# Patient Record
Sex: MALE | Race: WHITE | NOT HISPANIC OR LATINO | Employment: UNEMPLOYED | URBAN - METROPOLITAN AREA
[De-identification: names, ages, dates, MRNs, and addresses within clinical notes are randomized per-mention and may not be internally consistent; named-entity substitution may affect disease eponyms.]

---

## 2018-03-07 PROBLEM — R05.9 COUGH: Status: ACTIVE | Noted: 2018-03-07

## 2018-03-07 PROBLEM — E78.2 MIXED HYPERLIPIDEMIA: Chronic | Status: ACTIVE | Noted: 2018-03-07

## 2018-03-07 PROBLEM — F33.41 RECURRENT MAJOR DEPRESSIVE DISORDER, IN PARTIAL REMISSION: Chronic | Status: ACTIVE | Noted: 2018-03-07

## 2018-03-07 PROBLEM — I10 ESSENTIAL HYPERTENSION: Chronic | Status: ACTIVE | Noted: 2018-03-07

## 2019-03-07 PROBLEM — E11.42 TYPE 2 DIABETES MELLITUS WITH DIABETIC POLYNEUROPATHY, WITHOUT LONG-TERM CURRENT USE OF INSULIN: Status: ACTIVE | Noted: 2018-03-07

## 2019-03-07 PROBLEM — I10 ESSENTIAL HYPERTENSION: Status: ACTIVE | Noted: 2018-03-07

## 2019-03-07 PROBLEM — L03.115 CELLULITIS OF BOTH LOWER EXTREMITIES: Status: ACTIVE | Noted: 2019-03-07

## 2019-03-07 PROBLEM — L03.116 CELLULITIS OF BOTH LOWER EXTREMITIES: Status: ACTIVE | Noted: 2019-03-07

## 2019-03-08 PROBLEM — J18.9 PNEUMONIA OF RIGHT LOWER LOBE DUE TO INFECTIOUS ORGANISM: Status: ACTIVE | Noted: 2019-03-08

## 2019-03-08 PROBLEM — I87.2 VENOUS STASIS DERMATITIS OF RIGHT LOWER EXTREMITY: Status: ACTIVE | Noted: 2019-03-08

## 2019-03-08 PROBLEM — R63.8 INADEQUATE ORAL INTAKE: Status: ACTIVE | Noted: 2019-03-08

## 2019-03-09 PROBLEM — I47.20 VENTRICULAR TACHYCARDIA: Status: ACTIVE | Noted: 2019-03-09

## 2019-03-10 PROBLEM — I95.9 HYPOTENSION: Status: ACTIVE | Noted: 2019-03-10

## 2019-03-11 PROBLEM — I25.10 CORONARY ARTERY DISEASE INVOLVING NATIVE CORONARY ARTERY OF NATIVE HEART: Status: ACTIVE | Noted: 2019-03-11

## 2019-03-11 PROBLEM — R63.8 ALTERATION IN NUTRITION: Status: ACTIVE | Noted: 2019-03-11

## 2019-03-11 PROBLEM — I50.41 ACUTE COMBINED SYSTOLIC AND DIASTOLIC HEART FAILURE: Status: ACTIVE | Noted: 2019-03-11

## 2019-04-29 PROBLEM — I50.42 CHRONIC COMBINED SYSTOLIC AND DIASTOLIC CHF (CONGESTIVE HEART FAILURE): Status: ACTIVE | Noted: 2019-03-11

## 2019-04-29 PROBLEM — L03.116 CELLULITIS OF BOTH LOWER EXTREMITIES: Status: RESOLVED | Noted: 2019-03-07 | Resolved: 2019-04-29

## 2019-04-29 PROBLEM — L03.115 CELLULITIS OF BOTH LOWER EXTREMITIES: Status: RESOLVED | Noted: 2019-03-07 | Resolved: 2019-04-29

## 2019-04-29 PROBLEM — R63.8 INADEQUATE ORAL INTAKE: Status: RESOLVED | Noted: 2019-03-08 | Resolved: 2019-04-29

## 2019-04-29 PROBLEM — J18.9 PNEUMONIA OF RIGHT LOWER LOBE DUE TO INFECTIOUS ORGANISM: Status: RESOLVED | Noted: 2019-03-08 | Resolved: 2019-04-29

## 2019-04-30 PROBLEM — R60.9 EDEMA: Status: ACTIVE | Noted: 2019-04-30

## 2019-04-30 PROBLEM — I50.43 ACUTE ON CHRONIC COMBINED SYSTOLIC AND DIASTOLIC HEART FAILURE: Status: ACTIVE | Noted: 2019-03-11

## 2019-05-01 PROBLEM — D64.9 NORMOCYTIC ANEMIA: Status: ACTIVE | Noted: 2019-05-01

## 2019-05-01 PROBLEM — E83.51 HYPOCALCEMIA: Status: ACTIVE | Noted: 2019-05-01

## 2019-05-02 PROBLEM — E43 SEVERE MALNUTRITION: Status: ACTIVE | Noted: 2019-05-01

## 2019-05-14 PROBLEM — I95.9 HYPOTENSION: Status: RESOLVED | Noted: 2019-03-10 | Resolved: 2019-05-14

## 2019-05-14 PROBLEM — D51.0 PERNICIOUS ANEMIA: Status: ACTIVE | Noted: 2019-05-14

## 2019-05-14 PROBLEM — R63.8 ALTERATION IN NUTRITION: Status: RESOLVED | Noted: 2019-03-11 | Resolved: 2019-05-14

## 2019-05-14 PROBLEM — R05.9 COUGH: Status: RESOLVED | Noted: 2018-03-07 | Resolved: 2019-05-14

## 2019-11-11 PROBLEM — I25.10 CAD (CORONARY ARTERY DISEASE), NATIVE CORONARY ARTERY: Status: ACTIVE | Noted: 2019-11-11

## 2021-01-09 PROBLEM — F33.42 RECURRENT MAJOR DEPRESSIVE DISORDER, IN FULL REMISSION: Status: ACTIVE | Noted: 2021-01-09

## 2022-03-05 PROBLEM — D51.9 ANEMIA DUE TO VITAMIN B12 DEFICIENCY: Status: ACTIVE | Noted: 2022-03-05

## 2022-05-05 PROBLEM — R60.9 EDEMA: Status: RESOLVED | Noted: 2019-04-30 | Resolved: 2022-05-05

## 2022-05-05 PROBLEM — I47.20 VENTRICULAR TACHYCARDIA: Status: RESOLVED | Noted: 2019-03-09 | Resolved: 2022-05-05

## 2022-05-05 PROBLEM — F33.42 RECURRENT MAJOR DEPRESSIVE DISORDER, IN FULL REMISSION: Status: RESOLVED | Noted: 2021-01-09 | Resolved: 2022-05-05

## 2022-05-05 PROBLEM — E43 SEVERE MALNUTRITION: Status: RESOLVED | Noted: 2019-05-01 | Resolved: 2022-05-05

## 2022-08-20 PROBLEM — S01.81XA FOREHEAD LACERATION, INITIAL ENCOUNTER: Status: ACTIVE | Noted: 2022-08-20

## 2022-08-20 PROBLEM — S09.90XA ACUTE HEAD INJURY WITHOUT LOSS OF CONSCIOUSNESS: Status: ACTIVE | Noted: 2022-08-20

## 2024-04-25 PROBLEM — G93.40 ENCEPHALOPATHY: Status: ACTIVE | Noted: 2024-04-25

## 2024-04-29 ENCOUNTER — OUTPATIENT CASE MANAGEMENT (OUTPATIENT)
Dept: ADMINISTRATIVE | Facility: OTHER | Age: 84
End: 2024-04-29

## 2024-04-29 NOTE — PROGRESS NOTES
Outpatient Care Management  Patient Not Qualified    Patient: Garret Dimas  MRN:  [id number]  Date of Service:  4/29/2024  Completed by:  Namita Sandy RN    Chief Complaint   Patient presents with    OPCM RN First Assessment Attempt    OPCM Chart Review    OPCM Enrollment Call    Case Closure       Patient Summary           Reason Not Qualified:  Resides in Nursing Home